# Patient Record
Sex: MALE | Race: WHITE | NOT HISPANIC OR LATINO | URBAN - METROPOLITAN AREA
[De-identification: names, ages, dates, MRNs, and addresses within clinical notes are randomized per-mention and may not be internally consistent; named-entity substitution may affect disease eponyms.]

---

## 2017-08-24 ENCOUNTER — IMPORTED ENCOUNTER (OUTPATIENT)
Dept: URBAN - METROPOLITAN AREA CLINIC 50 | Facility: CLINIC | Age: 72
End: 2017-08-24

## 2018-08-17 ENCOUNTER — IMPORTED ENCOUNTER (OUTPATIENT)
Dept: URBAN - METROPOLITAN AREA CLINIC 50 | Facility: CLINIC | Age: 73
End: 2018-08-17

## 2019-08-23 ENCOUNTER — IMPORTED ENCOUNTER (OUTPATIENT)
Dept: URBAN - METROPOLITAN AREA CLINIC 50 | Facility: CLINIC | Age: 74
End: 2019-08-23

## 2020-09-10 ENCOUNTER — IMPORTED ENCOUNTER (OUTPATIENT)
Dept: URBAN - METROPOLITAN AREA CLINIC 50 | Facility: CLINIC | Age: 75
End: 2020-09-10

## 2021-04-17 ASSESSMENT — VISUAL ACUITY
OD_CC: J1+@ 14 IN
OD_SC: 20/25-2
OS_OTHER: 20/25.
OS_CC: 20/20
OD_SC: 20/25+
OD_OTHER: 20/25. 20/30.
OD_BAT: 20/70
OD_CC: 20/30
OD_CC: J1+
OS_OTHER: 20/25. 20/30.
OD_OTHER: 20/30.
OS_OTHER: 20/30. 20/60.
OD_BAT: 20/25
OD_CC: J1+@ 14 IN
OS_SC: 20/20
OS_BAT: 20/30
OS_CC: J1+
OD_PH: 20/30+
OS_CC: J1+@ 14 IN
OD_OTHER: 20/70. >20/400.
OS_SC: 20/20-1
OS_BAT: 20/25
OD_BAT: 20/30
OS_CC: J1+@ 14 IN
OD_SC: 20/40+1
OS_SC: 20/20-1
OS_BAT: 20/25

## 2021-04-17 ASSESSMENT — TONOMETRY
OD_IOP_MMHG: 15
OS_IOP_MMHG: 17
OS_IOP_MMHG: 14
OD_IOP_MMHG: 16
OD_IOP_MMHG: 14
OD_IOP_MMHG: 16
OS_IOP_MMHG: 15
OS_IOP_MMHG: 17

## 2021-09-30 ENCOUNTER — PREPPED CHART (OUTPATIENT)
Dept: URBAN - METROPOLITAN AREA CLINIC 50 | Facility: CLINIC | Age: 76
End: 2021-09-30

## 2021-10-05 ENCOUNTER — ANNUAL COMPREHENSIVE EXAM (OUTPATIENT)
Dept: URBAN - METROPOLITAN AREA CLINIC 50 | Facility: CLINIC | Age: 76
End: 2021-10-05

## 2021-10-05 DIAGNOSIS — H35.3131: ICD-10-CM

## 2021-10-05 DIAGNOSIS — H43.813: ICD-10-CM

## 2021-10-05 DIAGNOSIS — H04.123: ICD-10-CM

## 2021-10-05 DIAGNOSIS — H26.493: ICD-10-CM

## 2021-10-05 PROCEDURE — 92014 COMPRE OPH EXAM EST PT 1/>: CPT

## 2021-10-05 PROCEDURE — 92134 CPTRZ OPH DX IMG PST SGM RTA: CPT

## 2021-10-05 ASSESSMENT — VISUAL ACUITY
OD_GLARE: 20/60
OS_SC: 20/30+2
OS_GLARE: 20/50
OD_PH: 20/30
OD_SC: 20/40
OS_GLARE: 20/40
OD_GLARE: 20/50
OU_SC: 20/30+2

## 2021-10-05 ASSESSMENT — TONOMETRY
OD_IOP_MMHG: 18
OS_IOP_MMHG: 20

## 2021-11-11 NOTE — PATIENT DISCUSSION
Educated patient on findings and condition. Prescribe CL-safe artificial tears BID/prn OU. Advised to RTC if si/sx persist or worsen. Monitor.

## 2021-11-11 NOTE — PATIENT DISCUSSION
No holes, tears or retinal detachments. Discussed si/sx of RD including ^floaters/flashes/veil over vision and advised to RTC immediately if any occur. Monitor.

## 2021-12-03 NOTE — PATIENT DISCUSSION
Educated patient on findings. Demodex OD, blepharitis OU. Prescribe regular eyelid scrubs QD-BID OU and eyelid scrubs with tea tree oil cleanser 1-2x/week. Advised to RTC if si/sx persist or worsen. Monitor.

## 2021-12-03 NOTE — PATIENT DISCUSSION
Updated CL Rx and trials released to patient. Discussed proper wear/care/hygiene of CLs. RTC if discomfort.

## 2022-04-18 NOTE — PATIENT DISCUSSION
"""Call if vision decreases or RD warning signs increases/RD warnings given.  No signs of retinal tear
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  Recommend vitamins
"""Informed patient that their capsular opacification is not visually significant or does not meet the minimum criteria for capsulotomy.  Recommended attention to PCO symptoms
[Nl] : Neurological

## 2022-10-04 ENCOUNTER — COMPREHENSIVE EXAM (OUTPATIENT)
Dept: URBAN - METROPOLITAN AREA CLINIC 48 | Facility: LOCATION | Age: 77
End: 2022-10-04

## 2022-10-04 DIAGNOSIS — H02.831: ICD-10-CM

## 2022-10-04 DIAGNOSIS — H43.813: ICD-10-CM

## 2022-10-04 DIAGNOSIS — H35.3131: ICD-10-CM

## 2022-10-04 DIAGNOSIS — H26.493: ICD-10-CM

## 2022-10-04 DIAGNOSIS — H02.834: ICD-10-CM

## 2022-10-04 DIAGNOSIS — H04.123: ICD-10-CM

## 2022-10-04 PROCEDURE — 92134 CPTRZ OPH DX IMG PST SGM RTA: CPT

## 2022-10-04 PROCEDURE — 92014 COMPRE OPH EXAM EST PT 1/>: CPT

## 2022-10-04 ASSESSMENT — VISUAL ACUITY
OS_SC: 20/20
OD_SC: 20/20-2
OU_CC: J1+

## 2022-10-04 ASSESSMENT — TONOMETRY
OD_IOP_MMHG: 19
OS_IOP_MMHG: 19

## 2022-11-14 NOTE — PATIENT DISCUSSION
We discussed the risks benefits and alternatives to ICL. Risks include but aren’t limited to: Cataract formation, acute and long-term intraocular pressure increases, infection, lens dislocation, retinal detachment, need for subsequent surgery and change in vision that may require glasses or contacts. The patient was allowed to ask all questions and answered to their satisfaction.

## 2022-11-14 NOTE — PATIENT DISCUSSION
Patient would like to be less dependent on glasses/contact lenses with ICL. Schedule OD then OS proceeding.

## 2023-01-19 NOTE — PATIENT DISCUSSION
Advised patient to continue annual exams with Dr. Heaven Gagnon for general eyecare with updated refraction.  I will see patient PRN.

## 2023-01-19 NOTE — PATIENT DISCUSSION
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  No treatment needed at this time.  Continue to monitor.

## 2023-01-19 NOTE — PATIENT DISCUSSION
Discussed increased risk of floaters, myopic degeneration and retinal detachment associated with high myopia.  Retinal detachment warnings given.  Recommend observation.

## 2023-01-24 NOTE — PATIENT DISCUSSION
Patient elects to proceed with ICL OS, patient understands vision blurry due PEE from drops, corneal suture and dilation from surgery. Continue drops as prescribed.

## 2023-01-26 NOTE — PATIENT DISCUSSION
Iris tuck temporally; instilled vuity with negative side effects. Eye dilated to mitigate symptoms. OK with leaving office today.

## 2023-01-30 NOTE — PATIENT DISCUSSION
Will stop OMNI and patient to start ofloxacin 2 times daily for one week then discontinue and Prolensa twice daily for two weeks then discontinue.  Patient to return back in 3 weeks for suture removal.